# Patient Record
(demographics unavailable — no encounter records)

---

## 2025-03-24 NOTE — CARDIOLOGY SUMMARY
[Today's Date] : [unfilled] [FreeTextEntry1] : NSR @ 63 bpm.  WPW suggestive of a midseptal AP.  Otherwise, normal for age.

## 2025-03-24 NOTE — END OF VISIT
[FreeTextEntry3] : I, Dr. Darling, personally performed the evaluation and management (E/M) services for this established patient who presents today. That E/M includes conducting the examination, assessing all new/exacerbated conditions. reassessing pre-existing conditions, and establishing a new or updated plan of care. Today, the RN documented the Chief Complaint, source of information and performed med and allergy reconciliation with or without education.  The timing listed under billing is the time I personally spent reviewing material, evaluating the patient, discussing management issues, coordinating services, and making documentation. [Time Spent: ___ minutes] : I have spent [unfilled] minutes of time on the encounter which excludes teaching and separately reported services.

## 2025-03-24 NOTE — CONSULT LETTER
[Today's Date] : [unfilled] [Name] : Name: [unfilled] [] : : ~~ [Today's Date:] : [unfilled] [Dear  ___:] : Dear Dr. [unfilled]: [Consult] : I had the pleasure of evaluating your patient, [unfilled]. My full evaluation follows. [Consult - Single Provider] : Thank you very much for allowing me to participate in the care of this patient. If you have any questions, please do not hesitate to contact me. [Sincerely,] : Sincerely, [FreeTextEntry4] : Jim Julian MD [FreeTextEntry5] : [address], [place] [address] [de-identified] :    Tru Darling MD, [facility] Associate Chief, Pediatric Cardiology , Pediatric Cardiac Electrophysiology [facility] Professor of Pediatrics [facility]

## 2025-03-24 NOTE — REASON FOR VISIT
[Follow-Up] : a follow-up visit for [Parents] : parents [FreeTextEntry3] : hx: Robert-Parkinson-White Syndrome